# Patient Record
(demographics unavailable — no encounter records)

---

## 2024-10-17 NOTE — HISTORY OF PRESENT ILLNESS
[de-identified] : 41yF pw bl shoulder pain, has previously seen Dr. Mckeon.   MR Arthrogram 4/28/23 w bursitis.   Avid swimmer and sharp, transient pain with certain movements and with paddling. No n/p, no instability.

## 2024-10-17 NOTE — DISCUSSION/SUMMARY
[de-identified] : 41yF pw bl shoulder bursitis.  The patient was extensively counseled on treatment options including but not limited to observation, rest/activity modification, bracing, anti-inflammatory medications, physical therapy, injections, and surgery.  The natural history of the disease was thoroughly explained.  We discussed that the majority of the time, this condition can be initially treated conservatively. The patient will proceed with: -PT -nsaid prn -pt was instructed on the importance of resting, icing and elevating to minimize swelling -RTC 6w   I have personally obtained the history, reviewed the ROS as noted, and performed the physical examination today.  The patient and I discussed the assessment and options and developed the plan.  All questions were answered and the patient stated their understanding of the treatment plan and appreciation of the visit.   My cumulative time spent on this patient's visit included: Preparation for the visit, review of the medical records, review of pertinent diagnostic studies, examination and counseling of the patient on the above diagnosis, treatment plan and prognosis, orders of diagnostic tests, medications and/or appropriate procedures and documentation in the medical records of today's visit.   Stevie Bobo MD

## 2024-10-17 NOTE — PHYSICAL EXAM
[de-identified] :  Gen: NAD Resp: Nonlabored respirations, no SOB  Shoulder: Skin intact Tender over posterolateral deltoid 170/170/60/lumbar AROM 5/5 ER IR 4p/5 Abduction (+) Velasquez/Rubio (-) Belly press/bear hug (-) Speed/yergason 5/5 D B T EPL FDP IO SILT amur 2+ rad bcr  1+ ant/post instability (-) O'antonio's (-) load and release (-) apprehension (-) Shawn Edwards (-) sulcus sign (-) AC pain, cross body adduction  [de-identified] : The following radiographs were ordered and read by me during this patient's visit. I reviewed each radiograph in detail with the patient and discussed the findings as highlighted below.   3 views of the L + R shoulder were obtained today that show no fracture, or dislocation. There are no degenerative changes seen. There is no malalignment. No obvious osseous abnormality. Otherwise unremarkable.

## 2024-12-24 NOTE — PROCEDURE
[Transvaginal OB Sonogram] : Transvaginal OB Sonogram [Intrauterine Pregnancy] : intrauterine pregnancy [Yolk Sac] : yolk sac present [Date: ___] : Date: [unfilled] [Fetal Heart] : fetal heart present [Current GA by Sonogram: ___ (wks)] : Current GA by Sonogram: [unfilled]Uwks [___ day(s)] : [unfilled] days [Transvaginal OB Sonogram WNL] : Transvaginal OB Sonogram WNL [FreeTextEntry1] : SIUP CRL c/w 9+4, +FHT pos

## 2024-12-24 NOTE — PLAN
[FreeTextEntry1] : S=d, discussed PNL/susy and USC given. S/p flu vax. Recommend to f/u with genetic counseling again given partner +hereditary spherocytosis. Briefly discussed asa at 12 weeks for AMA. Will confirm at nv.

## 2024-12-24 NOTE — HISTORY OF PRESENT ILLNESS
[FreeTextEntry1] : LMP 10/19 40yo  here for amenorrhea and + UPT, would be 9+3 by LMP. Here with  Junito today. Notes GERD, minimal nausea. Hx missed AB s/p med mgmt with rPOCs s/p suction D&C 2024. S/p R breast biopsy x3 2024 and benign.   Junito with hereditary spherocytosis s/p GC (may be AD or AR inheritance). Pt had ANK1 testing that was neg, also had horizon testing that was negative. S/p flu vax  POB: missed AB s/p med mgmt with rPOCs s/p suctoin D&C 2024